# Patient Record
Sex: FEMALE | Race: WHITE | NOT HISPANIC OR LATINO | Employment: OTHER | URBAN - METROPOLITAN AREA
[De-identification: names, ages, dates, MRNs, and addresses within clinical notes are randomized per-mention and may not be internally consistent; named-entity substitution may affect disease eponyms.]

---

## 2017-08-30 NOTE — PATIENT DISCUSSION
(C72.613) Keratoconjunct sicca, not specified as Sjogren's, bilateral - Assesment : Examination revealed Dry Eye Syndrome OU. - Plan : Recommend artificial tears 2-3 times daily OU. Monitor for changes. Advised patient to call our office with decreased vision or increased symptoms.

## 2017-08-30 NOTE — PATIENT DISCUSSION
(H25.013) Cortical age-related cataract, bilateral - Assesment : Examination reveals moderate cataract, patient is symptomatic with visual function affected. - Plan : Risks, Benefits and Alternatives were discussed with patient at length for Cataract Surgery. Visual symptoms are consistent with Cataract findings on examination and current refraction no longer provides satisfactory vision. Discussed EROF lenses. Advised may need mild reader when reading fine print or reading in dim lighting. Significant astigmatism and does warrant toric lenses OU. Patient understands and wants to consider surgery. Advised to not changes rx if patient is going to consider cataract surgery. All questions answered. Risks, Benefits and Alternatives discussed at length for IOL placement. Doctor suggests TBD. Patient desires. Patient will need to wear glasses for. EYE: IOL TYPE: TBD POST OPERATIVE TARGET: PACKAGE:   COREY Kimbrough/see GB.

## 2017-08-30 NOTE — PATIENT DISCUSSION
(N25.849) Dermatochalasis of right upper eyelid - Assesment : Examination revealed Dermatochalasis - Plan : Monitor for changes. Advised patient to call our office with decreased vision or increased symptoms.

## 2017-08-30 NOTE — PATIENT DISCUSSION
(H25.13) Age-related nuclear cataract, bilateral - Assesment : Examination revealed cataract. - Plan : see plan #1.

## 2017-08-30 NOTE — PATIENT DISCUSSION
(H35.160) Drusen (degenerative) of macula, bilateral - Assesment : Examination revealed Drusen Ou. - Plan : Monitor for changes. Advised patient to call our office with decreased vision or increased symptoms.

## 2017-08-30 NOTE — PATIENT DISCUSSION
(N37.679) Dermatochalasis of left upper eyelid - Assesment : Examination revealed Dermatochalasis - Plan : Monitor for changes. Advised patient to call our office with decreased vision or increased symptoms.

## 2017-10-02 NOTE — PATIENT DISCUSSION
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 - Plan : Monitor for changes.

## 2017-10-02 NOTE — PATIENT DISCUSSION
(H25.013) Cortical age-related cataract, bilateral - Assesment : Examination reveals moderate cataract, patient is symptomatic with visual function affected. - Plan : Risks, Benefits and Alternatives were discussed with patient at length for Cataract Surgery. Visual symptoms are consistent with Cataract findings on examination and current refraction no longer provides satisfactory vision. Discussed EROF lenses. Advised patient may need mild reader when reading fine print or reading in dim lighting. Minimal astigmatism and does not warrant toric lenses OU. Patient wants to be mostly independent of glasses. Patient understands and wants to consider surgery. All questions answered. Risks, Benefits and Alternatives discussed at length for IOL placement. Doctor suggests EROF lens. Patient desires EROF lens. Patient will need to wear glasses for reading fine print or reading in dimmer lighting. EYE: OS IOL TYPE: EROF lens POST OPERATIVE TARGET: [place] PACKAGE:  ROF pkge OD to follow.

## 2017-10-02 NOTE — PATIENT DISCUSSION
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 - Plan : see plan #1.

## 2017-10-25 NOTE — PATIENT DISCUSSION
(H25.011) Cortical age-related cataract, right eye - Assesment : Examination reveals moderate cataract, patient is symptomatic with visual function affected. - Plan : Risks, Benefits and Alternatives were discussed with patient at length for Cataract Surgery. Visual symptoms are consistent with Cataract findings on examination and current refraction no longer provides satisfactory vision. Discussed EROF lens options. Advised patient may need mild reader when reading fine print or reading in dim lighting. Minimal astigmatism and does not warrant toric lenses OU. Patient wants to be mostly independent of glasses. Patient understands and wants to consider surgery. All questions answered. Risks, Benefits and Alternatives discussed at length for IOL placement. Doctor suggests EROF lens. Patient desires EROF lens. Patient will need to wear glasses for reading fine print or reading in dimmer lighting. EYE: OD IOL TYPE: EROF lens POST OPERATIVE TARGET: [place] PACKAGE:  ROF pkge RV monday prior to sx OD.

## 2017-10-25 NOTE — PATIENT DISCUSSION
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 ORA done in the OR. IOP elevated today OS. - Plan : Wound burped OS at slit lamp today without incident. Recommend starting Cosopt BID OS (samples given) until next visit. Discussed signs and symptoms of infection and retinal detachments. Do not rub operated eye. Follow drop schedule If redness,pain,decreased vision, flashes or floaters occur then contact clinic.

## 2017-11-01 NOTE — PATIENT DISCUSSION
(H11.31) Conjunctival hemorrhage, right eye - Assesment : Examination revealed subconjunctival hemorrhage. - Plan : Monitor for changes. Pt reassured.

## 2017-11-01 NOTE — PATIENT DISCUSSION
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 IOP high OD. - Plan : Discussed signs and symptoms of infection and retinal detachments. Do not rub operated eye. Follow drop schedule If redness,pain,decreased vision, flashes or floaters occur then contact clinic. Start Cosopt OD BID- continue till next visit. RTC 1 WEEK P/OP CHECK.

## 2017-11-07 NOTE — PATIENT DISCUSSION
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 Informed pt that his vision will likely improve more in the next weeks, some mild astigmatism present but not enough to have needed a TORIC lens at the time of surgery. - Plan : Discussed signs and symptoms of infection and retinal detachments. Do not rub operated eye. Follow drop schedule If redness,pain,decreased vision, flashes or floaters occur then contact clinic. AT's when reading and PRN. May use +1.25/+1.50 OTC readers PRN. RTC 3-4 WKS FINAL P/OP CHECK.

## 2017-12-06 NOTE — PATIENT DISCUSSION
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 Advised that residual astigmatism may continue to improve. May also need some time for brain to adjust to the defocus of the EROF lenses. - Plan : Signs and symptoms of infection and retinal detachment are outlined in your surgical packet. Do not rub operated eye. If redness, pain, decreased vision, flashes or floaters occur then contact clinic. RV 2-3 months follow up.

## 2017-12-06 NOTE — PATIENT DISCUSSION
(F21.153) Keratoconjunct sicca, not specified as Sjogren's, bilateral - Assesment : Examination revealed Dry Eye Syndrome OU. - Plan : Recommend artificial tears 2-3 times daily OU, especially while reading. (samples given) Monitor for changes. Advised patient to call our office with decreased vision or increased symptoms.

## 2018-01-25 ENCOUNTER — CONSULT (OUTPATIENT)
Dept: URBAN - METROPOLITAN AREA CLINIC 43 | Facility: CLINIC | Age: 78
End: 2018-01-25

## 2018-01-25 DIAGNOSIS — H02.835: ICD-10-CM

## 2018-01-25 DIAGNOSIS — H02.834: ICD-10-CM

## 2018-01-25 DIAGNOSIS — H02.831: ICD-10-CM

## 2018-01-25 DIAGNOSIS — H02.832: ICD-10-CM

## 2018-01-25 PROCEDURE — G8756 NO BP MEASURE DOC: HCPCS

## 2018-01-25 PROCEDURE — 1036F TOBACCO NON-USER: CPT

## 2018-01-25 PROCEDURE — 92285 EXTERNAL OCULAR PHOTOGRAPHY: CPT

## 2018-01-25 PROCEDURE — 99213 OFFICE O/P EST LOW 20 MIN: CPT

## 2018-01-25 PROCEDURE — G8428 CUR MEDS NOT DOCUMENT: HCPCS

## 2018-01-25 ASSESSMENT — VISUAL ACUITY
OS_SC: 20/40
OD_SC: 20/40

## 2018-01-29 ENCOUNTER — VISUAL FIELD (OUTPATIENT)
Dept: URBAN - METROPOLITAN AREA CLINIC 43 | Facility: CLINIC | Age: 78
End: 2018-01-29

## 2018-01-29 DIAGNOSIS — H02.831: ICD-10-CM

## 2018-01-29 DIAGNOSIS — H02.834: ICD-10-CM

## 2018-01-29 PROCEDURE — 92083 EXTENDED VISUAL FIELD XM: CPT

## 2018-01-29 PROCEDURE — 99211T TECH SERVICE

## 2018-03-05 ENCOUNTER — ESTABLISHED COMPREHENSIVE EXAM (OUTPATIENT)
Dept: URBAN - METROPOLITAN AREA CLINIC 43 | Facility: CLINIC | Age: 78
End: 2018-03-05

## 2018-03-05 DIAGNOSIS — H04.123: ICD-10-CM

## 2018-03-05 DIAGNOSIS — H02.403: ICD-10-CM

## 2018-03-05 DIAGNOSIS — H02.831: ICD-10-CM

## 2018-03-05 DIAGNOSIS — H02.832: ICD-10-CM

## 2018-03-05 DIAGNOSIS — Z96.1: ICD-10-CM

## 2018-03-05 DIAGNOSIS — H02.834: ICD-10-CM

## 2018-03-05 DIAGNOSIS — H02.835: ICD-10-CM

## 2018-03-05 PROCEDURE — 92014 COMPRE OPH EXAM EST PT 1/>: CPT

## 2018-03-05 PROCEDURE — G8427 DOCREV CUR MEDS BY ELIG CLIN: HCPCS

## 2018-03-05 PROCEDURE — 1036F TOBACCO NON-USER: CPT

## 2018-03-05 PROCEDURE — 92015 DETERMINE REFRACTIVE STATE: CPT

## 2018-03-05 ASSESSMENT — VISUAL ACUITY
OD_SC: 20/40-1
OS_SC: J1
OS_SC: 20/30
OD_SC: J1

## 2018-03-05 ASSESSMENT — TONOMETRY
OS_IOP_MMHG: 18
OD_IOP_MMHG: 18

## 2018-04-02 ENCOUNTER — SURGERY/PROCEDURE (OUTPATIENT)
Dept: URBAN - METROPOLITAN AREA CLINIC 43 | Facility: CLINIC | Age: 78
End: 2018-04-02

## 2018-04-02 DIAGNOSIS — H02.403: ICD-10-CM

## 2018-04-02 DIAGNOSIS — Z41.1: ICD-10-CM

## 2018-04-02 PROCEDURE — 67904SF LEVATOR APONEUROSIS ADV W/ FAT COSMETIC PER EYE

## 2018-04-02 PROCEDURE — 6790450 REPAIR EYELID DEFECT

## 2018-04-10 NOTE — PATIENT DISCUSSION
(T16.023) Keratoconjunct sicca, not specified as Sjogren's, bilateral - Assesment : Examination revealed Dry Eye Syndrome OU. - Plan : Monitor for changes. Advised patient to call our office with decreased vision or increased symptoms.  Recommend warm compresses QHS OU Continue artificial tears 2-3 times daily OU Rtc 12/2018 Exam

## 2018-04-10 NOTE — PATIENT DISCUSSION
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 Advised that residual astigmatism may continue to improve.  Advised patient wihth more time the brain will get used to IOL's pt can read without glasses most of the time Recommend glasses when driving at night to correct the astigmatism with janneth tint to help with halos at night around headlight - Plan : Monitor fr changes, call our office with any decrease in vision or changes in flashes/floaters Final Rx for glasses given to patient

## 2018-04-12 ENCOUNTER — POST-OP (OUTPATIENT)
Dept: URBAN - METROPOLITAN AREA CLINIC 43 | Facility: CLINIC | Age: 78
End: 2018-04-12

## 2018-04-12 DIAGNOSIS — Z98.890: ICD-10-CM

## 2018-04-12 DIAGNOSIS — H02.403: ICD-10-CM

## 2018-04-12 PROCEDURE — 99024 POSTOP FOLLOW-UP VISIT: CPT

## 2018-04-12 ASSESSMENT — VISUAL ACUITY
OD_SC: 20/40
OS_SC: 20/40-1

## 2018-06-14 NOTE — PATIENT DISCUSSION
(H11.32) Conjunctival hemorrhage, left eye - Assesment : Examination revealed extensive subconjunctival hemorrhage OS. Discussed with patient that it is just like a  bruise on the eye and that it will gradually absorb and go away. - Plan : Recommend the use of ATs 4-6 times daily for comfort. Monitor condition. Advised to call the office if no improvement or if worsens.   RTC PRN,otherwise keep scheduled appt in December/EXAM.

## 2019-01-08 NOTE — PATIENT DISCUSSION
(L91.706) Keratoconjunct sicca, not specified as Sjogren's, bilateral - Assesment : Examination revealed Dry Eye Syndrome OU. - Plan : Monitor for changes. Advised patient to call our office with decreased vision or increased symptoms.  Recommend warm compresses QHS OU Continue artificial tears 2-3 times daily OU  patient was given names of tears to use

## 2019-01-08 NOTE — PATIENT DISCUSSION
(H26.191) Other secondary cataract, right eye - Assesment : Significant posterior capsule opacification present. YAG not recommended. Trace PCO OD - Plan : Monitor for Changes. Advised patient to call our office with decreased vision or increased symptoms.   New RX was given to patient today  1 YEAR exam

## 2019-03-05 ENCOUNTER — ESTABLISHED COMPREHENSIVE EXAM (OUTPATIENT)
Dept: URBAN - METROPOLITAN AREA CLINIC 43 | Facility: CLINIC | Age: 79
End: 2019-03-05

## 2019-03-05 DIAGNOSIS — H04.123: ICD-10-CM

## 2019-03-05 DIAGNOSIS — Z96.1: ICD-10-CM

## 2019-03-05 PROCEDURE — 92014 COMPRE OPH EXAM EST PT 1/>: CPT

## 2019-03-05 PROCEDURE — 68761S PUNCTUM PLUG / SILICONE,EACH

## 2019-03-05 PROCEDURE — 92015 DETERMINE REFRACTIVE STATE: CPT

## 2019-03-05 PROCEDURE — A4263 PERMANENT TEAR DUCT PLUG: HCPCS

## 2019-03-05 RX ORDER — LOTEPREDNOL ETABONATE 5 MG/G: 1 GEL OPHTHALMIC

## 2019-03-05 ASSESSMENT — TONOMETRY
OD_IOP_MMHG: 15
OS_IOP_MMHG: 16

## 2019-03-05 ASSESSMENT — VISUAL ACUITY
OS_SC: J1
OS_SC: 20/40
OD_SC: J1
OD_SC: 20/40+2

## 2019-03-22 ENCOUNTER — FOLLOW UP (OUTPATIENT)
Dept: URBAN - METROPOLITAN AREA CLINIC 43 | Facility: CLINIC | Age: 79
End: 2019-03-22

## 2019-03-22 DIAGNOSIS — Z98.890: ICD-10-CM

## 2019-03-22 DIAGNOSIS — H04.123: ICD-10-CM

## 2019-03-22 PROCEDURE — 66999PO NON CO-MANAGED OTHER SURGERY PO

## 2019-03-22 ASSESSMENT — VISUAL ACUITY
OD_SC: 20/40-1
OS_SC: 20/30

## 2020-03-09 ENCOUNTER — ESTABLISHED COMPREHENSIVE EXAM (OUTPATIENT)
Dept: URBAN - METROPOLITAN AREA CLINIC 43 | Facility: CLINIC | Age: 80
End: 2020-03-09

## 2020-03-09 DIAGNOSIS — Z96.1: ICD-10-CM

## 2020-03-09 PROCEDURE — 92015 DETERMINE REFRACTIVE STATE: CPT

## 2020-03-09 PROCEDURE — 92014 COMPRE OPH EXAM EST PT 1/>: CPT

## 2020-03-09 ASSESSMENT — TONOMETRY
OD_IOP_MMHG: 15
OS_IOP_MMHG: 15

## 2020-03-09 ASSESSMENT — VISUAL ACUITY
OS_SC: 20/30-2
OS_SC: J1
OD_SC: J1
OD_SC: 20/30-2

## 2020-11-10 ENCOUNTER — FOLLOW UP (OUTPATIENT)
Dept: URBAN - METROPOLITAN AREA CLINIC 46 | Facility: CLINIC | Age: 80
End: 2020-11-10

## 2020-11-10 DIAGNOSIS — H04.123: ICD-10-CM

## 2020-11-10 DIAGNOSIS — H16.223: ICD-10-CM

## 2020-11-10 PROCEDURE — 92012 INTRM OPH EXAM EST PATIENT: CPT

## 2020-11-10 RX ORDER — CYCLOSPORINE 0 MG/ML
1 SOLUTION/ DROPS OPHTHALMIC; TOPICAL TWICE A DAY
Start: 2020-11-10

## 2020-11-10 ASSESSMENT — TONOMETRY
OD_IOP_MMHG: 23
OS_IOP_MMHG: 22

## 2020-11-10 ASSESSMENT — VISUAL ACUITY
OS_SC: 20/40
OD_SC: 20/40

## 2021-02-10 ENCOUNTER — FOLLOW UP (OUTPATIENT)
Dept: URBAN - METROPOLITAN AREA CLINIC 46 | Facility: CLINIC | Age: 81
End: 2021-02-10

## 2021-02-10 DIAGNOSIS — H04.123: ICD-10-CM

## 2021-02-10 DIAGNOSIS — H16.223: ICD-10-CM

## 2021-02-10 DIAGNOSIS — Z96.1: ICD-10-CM

## 2021-02-10 PROCEDURE — 92012 INTRM OPH EXAM EST PATIENT: CPT

## 2021-02-10 RX ORDER — LOTEPREDNOL ETABONATE 5 MG/G
1 GEL OPHTHALMIC EVERY EVENING
Start: 2021-02-10

## 2021-02-10 ASSESSMENT — VISUAL ACUITY
OD_SC: 20/50-2
OS_SC: 20/30-2
OD_PH: 20/30-2

## 2021-02-10 ASSESSMENT — TONOMETRY
OS_IOP_MMHG: 21
OD_IOP_MMHG: 21

## 2021-04-14 ENCOUNTER — ESTABLISHED COMPREHENSIVE EXAM (OUTPATIENT)
Dept: URBAN - METROPOLITAN AREA CLINIC 43 | Facility: CLINIC | Age: 81
End: 2021-04-14

## 2021-04-14 DIAGNOSIS — Z96.1: ICD-10-CM

## 2021-04-14 DIAGNOSIS — H16.223: ICD-10-CM

## 2021-04-14 DIAGNOSIS — H04.123: ICD-10-CM

## 2021-04-14 DIAGNOSIS — H40.013: ICD-10-CM

## 2021-04-14 PROCEDURE — 92133 CPTRZD OPH DX IMG PST SGM ON: CPT

## 2021-04-14 PROCEDURE — 92014 COMPRE OPH EXAM EST PT 1/>: CPT

## 2021-04-14 PROCEDURE — 92015 DETERMINE REFRACTIVE STATE: CPT

## 2021-04-14 ASSESSMENT — TONOMETRY
OD_IOP_MMHG: 15
OS_IOP_MMHG: 16

## 2021-04-14 ASSESSMENT — VISUAL ACUITY
OD_SC: J2-
OS_SC: 20/30-2
OD_SC: 20/40-1
OS_SC: J1

## 2022-11-07 ENCOUNTER — COMPREHENSIVE EXAM (OUTPATIENT)
Dept: URBAN - METROPOLITAN AREA CLINIC 46 | Facility: CLINIC | Age: 82
End: 2022-11-07

## 2022-11-07 DIAGNOSIS — H52.7: ICD-10-CM

## 2022-11-07 PROCEDURE — 92014 COMPRE OPH EXAM EST PT 1/>: CPT

## 2022-11-07 PROCEDURE — 92015 DETERMINE REFRACTIVE STATE: CPT

## 2022-11-07 ASSESSMENT — TONOMETRY
OS_IOP_MMHG: 18
OD_IOP_MMHG: 18

## 2022-11-07 ASSESSMENT — VISUAL ACUITY
OD_PH: 20/40
OU_SC: 20/30
OD_SC: 20/60
OS_SC: 20/30-1

## 2022-11-09 ENCOUNTER — APPOINTMENT (RX ONLY)
Dept: URBAN - METROPOLITAN AREA CLINIC 137 | Facility: CLINIC | Age: 82
Setting detail: DERMATOLOGY
End: 2022-11-09

## 2022-11-09 DIAGNOSIS — L82.1 OTHER SEBORRHEIC KERATOSIS: ICD-10-CM

## 2022-11-09 DIAGNOSIS — D18.0 HEMANGIOMA: ICD-10-CM | Status: UNCHANGED

## 2022-11-09 DIAGNOSIS — Z71.89 OTHER SPECIFIED COUNSELING: ICD-10-CM

## 2022-11-09 DIAGNOSIS — L57.8 OTHER SKIN CHANGES DUE TO CHRONIC EXPOSURE TO NONIONIZING RADIATION: ICD-10-CM | Status: UNCHANGED

## 2022-11-09 DIAGNOSIS — Z85.828 PERSONAL HISTORY OF OTHER MALIGNANT NEOPLASM OF SKIN: ICD-10-CM | Status: STABLE

## 2022-11-09 PROBLEM — D18.01 HEMANGIOMA OF SKIN AND SUBCUTANEOUS TISSUE: Status: ACTIVE | Noted: 2022-11-09

## 2022-11-09 PROCEDURE — ? COUNSELING

## 2022-11-09 PROCEDURE — ? SUNSCREEN RECOMMENDATIONS

## 2022-11-09 PROCEDURE — 99213 OFFICE O/P EST LOW 20 MIN: CPT

## 2022-11-09 PROCEDURE — ? POINTED OUT BY PATIENT

## 2022-11-09 PROCEDURE — ? OTC TREATMENT REGIMEN

## 2022-11-09 ASSESSMENT — LOCATION SIMPLE DESCRIPTION DERM
LOCATION SIMPLE: RIGHT PRETIBIAL REGION
LOCATION SIMPLE: UPPER BACK
LOCATION SIMPLE: LEFT ANTERIOR NECK
LOCATION SIMPLE: RIGHT CHEEK
LOCATION SIMPLE: LEFT PRETIBIAL REGION
LOCATION SIMPLE: ABDOMEN

## 2022-11-09 ASSESSMENT — LOCATION DETAILED DESCRIPTION DERM
LOCATION DETAILED: LEFT CLAVICULAR NECK
LOCATION DETAILED: INFERIOR THORACIC SPINE
LOCATION DETAILED: PERIUMBILICAL SKIN
LOCATION DETAILED: RIGHT INFERIOR CENTRAL MALAR CHEEK
LOCATION DETAILED: RIGHT DISTAL PRETIBIAL REGION
LOCATION DETAILED: LEFT DISTAL PRETIBIAL REGION
LOCATION DETAILED: RIGHT PROXIMAL PRETIBIAL REGION

## 2022-11-09 ASSESSMENT — LOCATION ZONE DERM
LOCATION ZONE: LEG
LOCATION ZONE: FACE
LOCATION ZONE: TRUNK
LOCATION ZONE: NECK

## 2022-11-09 NOTE — PROCEDURE: OTC TREATMENT REGIMEN
Detail Level: Zone
Patient Specific Otc Recommendations (Will Not Stick From Patient To Patient): Creams

## 2023-11-15 ENCOUNTER — PREPPED CHART (OUTPATIENT)
Dept: URBAN - METROPOLITAN AREA CLINIC 46 | Facility: CLINIC | Age: 83
End: 2023-11-15

## 2024-01-08 ENCOUNTER — COMPREHENSIVE EXAM (OUTPATIENT)
Dept: URBAN - METROPOLITAN AREA CLINIC 46 | Facility: CLINIC | Age: 84
End: 2024-01-08

## 2024-01-08 DIAGNOSIS — H40.013: ICD-10-CM

## 2024-01-08 DIAGNOSIS — H16.223: ICD-10-CM

## 2024-01-08 DIAGNOSIS — Z96.1: ICD-10-CM

## 2024-01-08 PROCEDURE — 92014 COMPRE OPH EXAM EST PT 1/>: CPT

## 2024-01-08 ASSESSMENT — TONOMETRY
OD_IOP_MMHG: 17
OS_IOP_MMHG: 18

## 2024-01-08 ASSESSMENT — VISUAL ACUITY
OD_PH: 20/25
OS_SC: J1+
OD_SC: J2
OS_SC: 20/30
OD_SC: 20/40+2

## 2024-01-15 ENCOUNTER — APPOINTMENT (RX ONLY)
Dept: URBAN - METROPOLITAN AREA CLINIC 137 | Facility: CLINIC | Age: 84
Setting detail: DERMATOLOGY
End: 2024-01-15

## 2024-01-15 DIAGNOSIS — L57.8 OTHER SKIN CHANGES DUE TO CHRONIC EXPOSURE TO NONIONIZING RADIATION: ICD-10-CM | Status: UNCHANGED

## 2024-01-15 DIAGNOSIS — Z85.828 PERSONAL HISTORY OF OTHER MALIGNANT NEOPLASM OF SKIN: ICD-10-CM | Status: STABLE

## 2024-01-15 DIAGNOSIS — D18.0 HEMANGIOMA: ICD-10-CM | Status: UNCHANGED

## 2024-01-15 DIAGNOSIS — L57.0 ACTINIC KERATOSIS: ICD-10-CM

## 2024-01-15 DIAGNOSIS — Z71.89 OTHER SPECIFIED COUNSELING: ICD-10-CM

## 2024-01-15 PROBLEM — D18.01 HEMANGIOMA OF SKIN AND SUBCUTANEOUS TISSUE: Status: ACTIVE | Noted: 2024-01-15

## 2024-01-15 PROCEDURE — 17003 DESTRUCT PREMALG LES 2-14: CPT

## 2024-01-15 PROCEDURE — 99213 OFFICE O/P EST LOW 20 MIN: CPT | Mod: 25

## 2024-01-15 PROCEDURE — ? SUNSCREEN RECOMMENDATIONS

## 2024-01-15 PROCEDURE — 17000 DESTRUCT PREMALG LESION: CPT

## 2024-01-15 PROCEDURE — ? COUNSELING

## 2024-01-15 PROCEDURE — ? LIQUID NITROGEN

## 2024-01-15 ASSESSMENT — LOCATION DETAILED DESCRIPTION DERM
LOCATION DETAILED: INFERIOR THORACIC SPINE
LOCATION DETAILED: PERIUMBILICAL SKIN
LOCATION DETAILED: LEFT ANTIHELIX
LOCATION DETAILED: LEFT CLAVICULAR NECK
LOCATION DETAILED: LEFT SUPERIOR HELIX

## 2024-01-15 ASSESSMENT — LOCATION ZONE DERM
LOCATION ZONE: NECK
LOCATION ZONE: TRUNK
LOCATION ZONE: EAR

## 2024-01-15 ASSESSMENT — LOCATION SIMPLE DESCRIPTION DERM
LOCATION SIMPLE: ABDOMEN
LOCATION SIMPLE: LEFT EAR
LOCATION SIMPLE: UPPER BACK
LOCATION SIMPLE: LEFT ANTERIOR NECK

## 2024-03-18 ENCOUNTER — APPOINTMENT (RX ONLY)
Dept: URBAN - METROPOLITAN AREA CLINIC 137 | Facility: CLINIC | Age: 84
Setting detail: DERMATOLOGY
End: 2024-03-18

## 2024-03-18 DIAGNOSIS — T07XXXA ABRASION OR FRICTION BURN OF OTHER, MULTIPLE, AND UNSPECIFIED SITES, WITHOUT MENTION OF INFECTION: ICD-10-CM | Status: RESOLVING

## 2024-03-18 DIAGNOSIS — L82.1 OTHER SEBORRHEIC KERATOSIS: ICD-10-CM

## 2024-03-18 DIAGNOSIS — L57.8 OTHER SKIN CHANGES DUE TO CHRONIC EXPOSURE TO NONIONIZING RADIATION: ICD-10-CM

## 2024-03-18 PROBLEM — S80.211A ABRASION, RIGHT KNEE, INITIAL ENCOUNTER: Status: ACTIVE | Noted: 2024-03-18

## 2024-03-18 PROCEDURE — ? COUNSELING

## 2024-03-18 PROCEDURE — 99213 OFFICE O/P EST LOW 20 MIN: CPT

## 2024-03-18 ASSESSMENT — LOCATION SIMPLE DESCRIPTION DERM
LOCATION SIMPLE: LEFT CALF
LOCATION SIMPLE: UPPER BACK
LOCATION SIMPLE: LEFT THIGH
LOCATION SIMPLE: RIGHT KNEE

## 2024-03-18 ASSESSMENT — LOCATION DETAILED DESCRIPTION DERM
LOCATION DETAILED: SUPERIOR THORACIC SPINE
LOCATION DETAILED: LEFT PROXIMAL LATERAL CALF
LOCATION DETAILED: RIGHT KNEE
LOCATION DETAILED: LEFT ANTERIOR DISTAL THIGH

## 2024-03-18 ASSESSMENT — LOCATION ZONE DERM
LOCATION ZONE: TRUNK
LOCATION ZONE: LEG

## 2024-04-03 ENCOUNTER — APPOINTMENT (RX ONLY)
Dept: URBAN - METROPOLITAN AREA CLINIC 137 | Facility: CLINIC | Age: 84
Setting detail: DERMATOLOGY
End: 2024-04-03

## 2024-04-03 DIAGNOSIS — L92.3 FOREIGN BODY GRANULOMA OF THE SKIN AND SUBCUTANEOUS TISSUE: ICD-10-CM

## 2024-04-03 PROCEDURE — ? ADDITIONAL NOTES

## 2024-04-03 PROCEDURE — ? COUNSELING

## 2024-04-03 PROCEDURE — ? FOREIGN BODY REMOVAL

## 2024-04-03 PROCEDURE — 10120 INC&RMVL FB SUBQ TISS SMPL: CPT

## 2024-04-03 ASSESSMENT — LOCATION DETAILED DESCRIPTION DERM: LOCATION DETAILED: RIGHT KNEE

## 2024-04-03 ASSESSMENT — LOCATION SIMPLE DESCRIPTION DERM: LOCATION SIMPLE: RIGHT KNEE

## 2024-04-03 ASSESSMENT — LOCATION ZONE DERM: LOCATION ZONE: LEG

## 2024-04-03 NOTE — PROCEDURE: FOREIGN BODY REMOVAL
Anesthesia Type: 1% lidocaine with epinephrine
Detail Level: Detailed
Anesthesia Volume In Cc: 0.5
Bill For Simple Or Complicated Fb Removal?: simple

## 2024-04-03 NOTE — PROCEDURE: ADDITIONAL NOTES
Additional Notes: Pt states she fell in gravel and gravel was stuck and healed under her skin. Dr. Starks removed with an 11 blade
Render Risk Assessment In Note?: no
Detail Level: Simple

## 2025-01-08 ENCOUNTER — APPOINTMENT (OUTPATIENT)
Dept: URBAN - METROPOLITAN AREA CLINIC 137 | Facility: CLINIC | Age: 85
Setting detail: DERMATOLOGY
End: 2025-01-08

## 2025-01-08 DIAGNOSIS — L57.8 OTHER SKIN CHANGES DUE TO CHRONIC EXPOSURE TO NONIONIZING RADIATION: ICD-10-CM | Status: UNCHANGED

## 2025-01-08 DIAGNOSIS — Z85.828 PERSONAL HISTORY OF OTHER MALIGNANT NEOPLASM OF SKIN: ICD-10-CM | Status: STABLE

## 2025-01-08 DIAGNOSIS — Z71.89 OTHER SPECIFIED COUNSELING: ICD-10-CM

## 2025-01-08 DIAGNOSIS — Z87.2 PERSONAL HISTORY OF DISEASES OF THE SKIN AND SUBCUTANEOUS TISSUE: ICD-10-CM

## 2025-01-08 DIAGNOSIS — D18.0 HEMANGIOMA: ICD-10-CM | Status: UNCHANGED

## 2025-01-08 PROBLEM — D18.01 HEMANGIOMA OF SKIN AND SUBCUTANEOUS TISSUE: Status: ACTIVE | Noted: 2025-01-08

## 2025-01-08 PROCEDURE — ? SUNSCREEN RECOMMENDATIONS

## 2025-01-08 PROCEDURE — 99213 OFFICE O/P EST LOW 20 MIN: CPT

## 2025-01-08 PROCEDURE — ? COUNSELING

## 2025-01-08 ASSESSMENT — LOCATION ZONE DERM
LOCATION ZONE: TRUNK
LOCATION ZONE: FACE
LOCATION ZONE: EAR
LOCATION ZONE: NECK

## 2025-01-08 ASSESSMENT — LOCATION DETAILED DESCRIPTION DERM
LOCATION DETAILED: RIGHT SUPERIOR CRUS OF ANTIHELIX
LOCATION DETAILED: LEFT CLAVICULAR NECK
LOCATION DETAILED: INFERIOR THORACIC SPINE
LOCATION DETAILED: RIGHT INFERIOR CENTRAL MALAR CHEEK
LOCATION DETAILED: PERIUMBILICAL SKIN
LOCATION DETAILED: LOWER STERNUM

## 2025-01-08 ASSESSMENT — LOCATION SIMPLE DESCRIPTION DERM
LOCATION SIMPLE: UPPER BACK
LOCATION SIMPLE: RIGHT CHEEK
LOCATION SIMPLE: CHEST
LOCATION SIMPLE: RIGHT EAR
LOCATION SIMPLE: ABDOMEN
LOCATION SIMPLE: LEFT ANTERIOR NECK

## 2025-01-08 NOTE — HPI: PREVENTATIVE SKIN CHECK
What Is The Reason For Today's Visit?: Full Body Skin Examination
Additional History: Area of concern right ear, mid chest.

## 2025-01-10 ENCOUNTER — COMPREHENSIVE EXAM (OUTPATIENT)
Age: 85
End: 2025-01-10

## 2025-01-10 DIAGNOSIS — Z96.1: ICD-10-CM

## 2025-01-10 DIAGNOSIS — H16.223: ICD-10-CM

## 2025-01-10 DIAGNOSIS — H40.013: ICD-10-CM

## 2025-01-10 PROCEDURE — 92014 COMPRE OPH EXAM EST PT 1/>: CPT

## 2025-01-10 PROCEDURE — 92133 CPTRZD OPH DX IMG PST SGM ON: CPT
